# Patient Record
Sex: FEMALE | Race: BLACK OR AFRICAN AMERICAN | NOT HISPANIC OR LATINO | URBAN - METROPOLITAN AREA
[De-identification: names, ages, dates, MRNs, and addresses within clinical notes are randomized per-mention and may not be internally consistent; named-entity substitution may affect disease eponyms.]

---

## 2017-03-08 ENCOUNTER — AMBULATORY - HEALTHEAST (OUTPATIENT)
Dept: VASCULAR SURGERY | Facility: CLINIC | Age: 72
End: 2017-03-08

## 2017-03-08 DIAGNOSIS — M51.369 DEGENERATION OF INTERVERTEBRAL DISC OF LUMBAR REGION: ICD-10-CM

## 2017-03-08 RX ORDER — ATORVASTATIN CALCIUM 80 MG/1
80 TABLET, FILM COATED ORAL
Status: SHIPPED | COMMUNITY
Start: 2016-10-05

## 2017-03-08 RX ORDER — GABAPENTIN 300 MG/1
300 CAPSULE ORAL
Status: SHIPPED | COMMUNITY
Start: 2017-02-28

## 2017-03-08 RX ORDER — DOCUSATE SODIUM 100 MG/1
100 CAPSULE, LIQUID FILLED ORAL
Status: SHIPPED | COMMUNITY
Start: 2015-07-10

## 2017-03-08 RX ORDER — LISINOPRIL 5 MG/1
5 TABLET ORAL
Status: SHIPPED | COMMUNITY
Start: 2016-07-14

## 2017-03-08 RX ORDER — ACETAMINOPHEN 500 MG
1000 TABLET ORAL DAILY PRN
Status: SHIPPED | COMMUNITY
Start: 2014-10-28

## 2017-03-08 RX ORDER — TRAMADOL HYDROCHLORIDE 50 MG/1
50 TABLET ORAL
Status: SHIPPED | COMMUNITY
Start: 2017-03-06

## 2017-03-08 ASSESSMENT — MIFFLIN-ST. JEOR: SCORE: 1343.65

## 2017-03-09 ENCOUNTER — OFFICE VISIT - HEALTHEAST (OUTPATIENT)
Dept: VASCULAR SURGERY | Facility: CLINIC | Age: 72
End: 2017-03-09

## 2017-03-09 DIAGNOSIS — C56.9 OVARIAN CA, UNSPECIFIED LATERALITY (H): ICD-10-CM

## 2017-03-09 DIAGNOSIS — I89.0 ACQUIRED LYMPHEDEMA OF LEG: ICD-10-CM

## 2017-03-09 DIAGNOSIS — E55.9 VITAMIN D DEFICIENCY: ICD-10-CM

## 2017-03-09 DIAGNOSIS — L90.5 SCAR CONDITION AND FIBROSIS OF SKIN: ICD-10-CM

## 2017-03-09 DIAGNOSIS — M79.604 LEG PAIN, BILATERAL: ICD-10-CM

## 2017-03-09 DIAGNOSIS — M79.89 LEG SWELLING: ICD-10-CM

## 2017-03-09 DIAGNOSIS — M79.605 LEG PAIN, BILATERAL: ICD-10-CM

## 2017-03-09 DIAGNOSIS — L03.119 CELLULITIS OF LOWER EXTREMITY, UNSPECIFIED LATERALITY: ICD-10-CM

## 2017-03-09 DIAGNOSIS — R53.83 FATIGUE: ICD-10-CM

## 2017-03-09 ASSESSMENT — MIFFLIN-ST. JEOR: SCORE: 1343.65

## 2017-03-10 ENCOUNTER — HOSPITAL ENCOUNTER (OUTPATIENT)
Dept: CT IMAGING | Facility: CLINIC | Age: 72
Discharge: HOME | End: 2017-03-10
Attending: PHYSICAL MEDICINE & REHABILITATION

## 2017-03-10 DIAGNOSIS — I89.0 ACQUIRED LYMPHEDEMA OF LEG: ICD-10-CM

## 2017-03-10 DIAGNOSIS — L90.5 SCAR CONDITION AND FIBROSIS OF SKIN: ICD-10-CM

## 2017-03-10 DIAGNOSIS — E55.9 VITAMIN D DEFICIENCY: ICD-10-CM

## 2017-03-10 DIAGNOSIS — M79.605 LEG PAIN, BILATERAL: ICD-10-CM

## 2017-03-10 DIAGNOSIS — C56.9 OVARIAN CA, UNSPECIFIED LATERALITY (H): ICD-10-CM

## 2017-03-10 DIAGNOSIS — M79.604 LEG PAIN, BILATERAL: ICD-10-CM

## 2017-03-10 DIAGNOSIS — L03.119 CELLULITIS OF LOWER EXTREMITY, UNSPECIFIED LATERALITY: ICD-10-CM

## 2017-03-10 DIAGNOSIS — M79.89 LEG SWELLING: ICD-10-CM

## 2017-03-10 DIAGNOSIS — R53.83 FATIGUE: ICD-10-CM

## 2017-03-13 ENCOUNTER — COMMUNICATION - HEALTHEAST (OUTPATIENT)
Dept: VASCULAR SURGERY | Facility: CLINIC | Age: 72
End: 2017-03-13

## 2017-03-14 ENCOUNTER — COMMUNICATION - HEALTHEAST (OUTPATIENT)
Dept: VASCULAR SURGERY | Facility: CLINIC | Age: 72
End: 2017-03-14

## 2017-03-14 ENCOUNTER — AMBULATORY - HEALTHEAST (OUTPATIENT)
Dept: VASCULAR SURGERY | Facility: CLINIC | Age: 72
End: 2017-03-14

## 2017-03-14 DIAGNOSIS — C56.9 OVARIAN CA, UNSPECIFIED LATERALITY (H): ICD-10-CM

## 2017-03-16 ENCOUNTER — AMBULATORY - HEALTHEAST (OUTPATIENT)
Dept: LAB | Facility: CLINIC | Age: 72
End: 2017-03-16

## 2017-03-16 ENCOUNTER — COMMUNICATION - HEALTHEAST (OUTPATIENT)
Dept: VASCULAR SURGERY | Facility: CLINIC | Age: 72
End: 2017-03-16

## 2017-03-16 DIAGNOSIS — C56.9 OVARIAN CA, UNSPECIFIED LATERALITY (H): ICD-10-CM

## 2017-03-16 LAB — CANCER AG125 SERPL-ACNC: 29.5 U/ML (ref 0–35)

## 2017-03-19 LAB
MISCELLANEOUS TEST DEPT. - HE HISTORICAL: NORMAL
PERFORMING LAB: NORMAL
SPECIMEN STATUS: NORMAL
TEST NAME: NORMAL

## 2017-03-24 ENCOUNTER — RECORDS - HEALTHEAST (OUTPATIENT)
Dept: ADMINISTRATIVE | Facility: OTHER | Age: 72
End: 2017-03-24

## 2017-03-30 ENCOUNTER — HOSPITAL ENCOUNTER (OUTPATIENT)
Dept: PET IMAGING | Facility: HOSPITAL | Age: 72
Discharge: HOME | End: 2017-03-30
Attending: OBSTETRICS & GYNECOLOGY

## 2017-03-30 ENCOUNTER — AMBULATORY - HEALTHEAST (OUTPATIENT)
Dept: VASCULAR SURGERY | Facility: CLINIC | Age: 72
End: 2017-03-30

## 2017-03-30 DIAGNOSIS — C53.9 CERVICAL CANCER (H): ICD-10-CM

## 2017-04-27 ENCOUNTER — AMBULATORY - HEALTHEAST (OUTPATIENT)
Dept: VASCULAR SURGERY | Facility: CLINIC | Age: 72
End: 2017-04-27

## 2017-04-27 ENCOUNTER — OFFICE VISIT - HEALTHEAST (OUTPATIENT)
Dept: VASCULAR SURGERY | Facility: CLINIC | Age: 72
End: 2017-04-27

## 2017-04-27 DIAGNOSIS — M79.89 LEG SWELLING: ICD-10-CM

## 2017-04-27 DIAGNOSIS — C53.9: ICD-10-CM

## 2017-04-27 DIAGNOSIS — L90.5 SCAR CONDITION AND FIBROSIS OF SKIN: ICD-10-CM

## 2017-04-27 DIAGNOSIS — M79.604 LEG PAIN, BILATERAL: ICD-10-CM

## 2017-04-27 DIAGNOSIS — I89.0 LYMPHEDEMA: ICD-10-CM

## 2017-04-27 DIAGNOSIS — M79.605 LEG PAIN, BILATERAL: ICD-10-CM

## 2017-04-27 RX ORDER — OMEGA-3S/DHA/EPA/FISH OIL/D3 300MG-1000
800 CAPSULE ORAL
Status: SHIPPED | COMMUNITY
Start: 2017-03-23

## 2017-04-27 RX ORDER — FUROSEMIDE 20 MG
TABLET ORAL
Status: SHIPPED | COMMUNITY
Start: 2017-03-09

## 2017-05-22 ENCOUNTER — COMMUNICATION - HEALTHEAST (OUTPATIENT)
Dept: VASCULAR SURGERY | Facility: CLINIC | Age: 72
End: 2017-05-22

## 2017-05-23 ENCOUNTER — COMMUNICATION - HEALTHEAST (OUTPATIENT)
Dept: VASCULAR SURGERY | Facility: CLINIC | Age: 72
End: 2017-05-23

## 2017-06-19 ENCOUNTER — RECORDS - HEALTHEAST (OUTPATIENT)
Dept: ADMINISTRATIVE | Facility: OTHER | Age: 72
End: 2017-06-19

## 2017-06-22 ENCOUNTER — COMMUNICATION - HEALTHEAST (OUTPATIENT)
Dept: VASCULAR SURGERY | Facility: CLINIC | Age: 72
End: 2017-06-22

## 2017-07-11 ENCOUNTER — AMBULATORY - HEALTHEAST (OUTPATIENT)
Dept: VASCULAR SURGERY | Facility: CLINIC | Age: 72
End: 2017-07-11

## 2017-08-21 ENCOUNTER — OFFICE VISIT - HEALTHEAST (OUTPATIENT)
Dept: VASCULAR SURGERY | Facility: CLINIC | Age: 72
End: 2017-08-21

## 2017-08-21 DIAGNOSIS — M79.605 LEG PAIN, BILATERAL: ICD-10-CM

## 2017-08-21 DIAGNOSIS — I89.0 ACQUIRED LYMPHEDEMA OF LEG: ICD-10-CM

## 2017-08-21 DIAGNOSIS — M79.604 LEG PAIN, BILATERAL: ICD-10-CM

## 2017-08-21 DIAGNOSIS — E55.9 VITAMIN D DEFICIENCY: ICD-10-CM

## 2017-08-21 DIAGNOSIS — M21.961: ICD-10-CM

## 2017-08-21 DIAGNOSIS — M21.962: ICD-10-CM

## 2017-08-21 DIAGNOSIS — I89.0 ELEPHANTIASIS: ICD-10-CM

## 2017-08-21 DIAGNOSIS — E66.01 MORBID (SEVERE) OBESITY DUE TO EXCESS CALORIES (H): ICD-10-CM

## 2017-08-21 DIAGNOSIS — M21.952: ICD-10-CM

## 2017-08-21 DIAGNOSIS — M21.951: ICD-10-CM

## 2017-08-24 ENCOUNTER — RECORDS - HEALTHEAST (OUTPATIENT)
Dept: ADMINISTRATIVE | Facility: OTHER | Age: 72
End: 2017-08-24

## 2017-08-24 ENCOUNTER — COMMUNICATION - HEALTHEAST (OUTPATIENT)
Dept: VASCULAR SURGERY | Facility: CLINIC | Age: 72
End: 2017-08-24

## 2017-08-28 ENCOUNTER — RECORDS - HEALTHEAST (OUTPATIENT)
Dept: ADMINISTRATIVE | Facility: OTHER | Age: 72
End: 2017-08-28

## 2018-03-07 ENCOUNTER — COMMUNICATION - HEALTHEAST (OUTPATIENT)
Dept: VASCULAR SURGERY | Facility: CLINIC | Age: 73
End: 2018-03-07

## 2018-03-20 ENCOUNTER — COMMUNICATION - HEALTHEAST (OUTPATIENT)
Dept: VASCULAR SURGERY | Facility: CLINIC | Age: 73
End: 2018-03-20

## 2018-03-21 ENCOUNTER — OFFICE VISIT - HEALTHEAST (OUTPATIENT)
Dept: VASCULAR SURGERY | Facility: CLINIC | Age: 73
End: 2018-03-21

## 2018-03-21 ENCOUNTER — HOME CARE/HOSPICE - HEALTHEAST (OUTPATIENT)
Dept: HOME HEALTH SERVICES | Facility: HOME HEALTH | Age: 73
End: 2018-03-21

## 2018-03-21 DIAGNOSIS — C53.9: ICD-10-CM

## 2018-03-21 DIAGNOSIS — I89.0 ELEPHANTIASIS: ICD-10-CM

## 2018-03-21 DIAGNOSIS — M21.959 ACQUIRED LEG DEFORMITY: ICD-10-CM

## 2018-03-21 DIAGNOSIS — M79.604 LEG PAIN, BILATERAL: ICD-10-CM

## 2018-03-21 DIAGNOSIS — M79.89 LEG SWELLING: ICD-10-CM

## 2018-03-21 DIAGNOSIS — M79.605 LEG PAIN, BILATERAL: ICD-10-CM

## 2018-03-21 DIAGNOSIS — I89.0 ACQUIRED LYMPHEDEMA OF LEG: ICD-10-CM

## 2018-03-21 DIAGNOSIS — I89.0 LYMPHEDEMA: ICD-10-CM

## 2018-03-21 DIAGNOSIS — E66.01 MORBID OBESITY WITH BMI OF 40.0-44.9, ADULT (H): ICD-10-CM

## 2018-03-23 ENCOUNTER — COMMUNICATION - HEALTHEAST (OUTPATIENT)
Dept: HOME HEALTH SERVICES | Facility: HOME HEALTH | Age: 73
End: 2018-03-23

## 2018-03-24 ENCOUNTER — HOME CARE/HOSPICE - HEALTHEAST (OUTPATIENT)
Dept: HOME HEALTH SERVICES | Facility: HOME HEALTH | Age: 73
End: 2018-03-24

## 2018-03-26 ENCOUNTER — COMMUNICATION - HEALTHEAST (OUTPATIENT)
Dept: HOME HEALTH SERVICES | Facility: HOME HEALTH | Age: 73
End: 2018-03-26

## 2018-03-28 ENCOUNTER — HOME CARE/HOSPICE - HEALTHEAST (OUTPATIENT)
Dept: HOME HEALTH SERVICES | Facility: HOME HEALTH | Age: 73
End: 2018-03-28

## 2018-04-02 ENCOUNTER — HOME CARE/HOSPICE - HEALTHEAST (OUTPATIENT)
Dept: HOME HEALTH SERVICES | Facility: HOME HEALTH | Age: 73
End: 2018-04-02

## 2018-04-03 ENCOUNTER — HOME CARE/HOSPICE - HEALTHEAST (OUTPATIENT)
Dept: HOME HEALTH SERVICES | Facility: HOME HEALTH | Age: 73
End: 2018-04-03

## 2018-04-04 ENCOUNTER — HOME CARE/HOSPICE - HEALTHEAST (OUTPATIENT)
Dept: HOME HEALTH SERVICES | Facility: HOME HEALTH | Age: 73
End: 2018-04-04

## 2018-04-05 ENCOUNTER — HOME CARE/HOSPICE - HEALTHEAST (OUTPATIENT)
Dept: HOME HEALTH SERVICES | Facility: HOME HEALTH | Age: 73
End: 2018-04-05

## 2018-04-06 ENCOUNTER — HOME CARE/HOSPICE - HEALTHEAST (OUTPATIENT)
Dept: HOME HEALTH SERVICES | Facility: HOME HEALTH | Age: 73
End: 2018-04-06

## 2018-04-09 ENCOUNTER — HOME CARE/HOSPICE - HEALTHEAST (OUTPATIENT)
Dept: HOME HEALTH SERVICES | Facility: HOME HEALTH | Age: 73
End: 2018-04-09

## 2018-04-10 ENCOUNTER — HOME CARE/HOSPICE - HEALTHEAST (OUTPATIENT)
Dept: HOME HEALTH SERVICES | Facility: HOME HEALTH | Age: 73
End: 2018-04-10

## 2018-04-11 ENCOUNTER — HOME CARE/HOSPICE - HEALTHEAST (OUTPATIENT)
Dept: HOME HEALTH SERVICES | Facility: HOME HEALTH | Age: 73
End: 2018-04-11

## 2018-04-12 ENCOUNTER — HOME CARE/HOSPICE - HEALTHEAST (OUTPATIENT)
Dept: HOME HEALTH SERVICES | Facility: HOME HEALTH | Age: 73
End: 2018-04-12

## 2018-04-13 ENCOUNTER — HOME CARE/HOSPICE - HEALTHEAST (OUTPATIENT)
Dept: HOME HEALTH SERVICES | Facility: HOME HEALTH | Age: 73
End: 2018-04-13

## 2018-04-13 ENCOUNTER — COMMUNICATION - HEALTHEAST (OUTPATIENT)
Dept: HOME HEALTH SERVICES | Facility: HOME HEALTH | Age: 73
End: 2018-04-13

## 2018-04-16 ENCOUNTER — HOME CARE/HOSPICE - HEALTHEAST (OUTPATIENT)
Dept: HOME HEALTH SERVICES | Facility: HOME HEALTH | Age: 73
End: 2018-04-16

## 2018-04-17 ENCOUNTER — HOME CARE/HOSPICE - HEALTHEAST (OUTPATIENT)
Dept: HOME HEALTH SERVICES | Facility: HOME HEALTH | Age: 73
End: 2018-04-17

## 2018-04-18 ENCOUNTER — HOME CARE/HOSPICE - HEALTHEAST (OUTPATIENT)
Dept: HOME HEALTH SERVICES | Facility: HOME HEALTH | Age: 73
End: 2018-04-18

## 2018-04-19 ENCOUNTER — HOME CARE/HOSPICE - HEALTHEAST (OUTPATIENT)
Dept: HOME HEALTH SERVICES | Facility: HOME HEALTH | Age: 73
End: 2018-04-19

## 2018-04-20 ENCOUNTER — HOME CARE/HOSPICE - HEALTHEAST (OUTPATIENT)
Dept: HOME HEALTH SERVICES | Facility: HOME HEALTH | Age: 73
End: 2018-04-20

## 2018-04-23 ENCOUNTER — HOME CARE/HOSPICE - HEALTHEAST (OUTPATIENT)
Dept: HOME HEALTH SERVICES | Facility: HOME HEALTH | Age: 73
End: 2018-04-23

## 2018-04-24 ENCOUNTER — HOME CARE/HOSPICE - HEALTHEAST (OUTPATIENT)
Dept: HOME HEALTH SERVICES | Facility: HOME HEALTH | Age: 73
End: 2018-04-24

## 2018-04-25 ENCOUNTER — HOME CARE/HOSPICE - HEALTHEAST (OUTPATIENT)
Dept: HOME HEALTH SERVICES | Facility: HOME HEALTH | Age: 73
End: 2018-04-25

## 2018-04-26 ENCOUNTER — HOME CARE/HOSPICE - HEALTHEAST (OUTPATIENT)
Dept: HOME HEALTH SERVICES | Facility: HOME HEALTH | Age: 73
End: 2018-04-26

## 2018-04-27 ENCOUNTER — HOME CARE/HOSPICE - HEALTHEAST (OUTPATIENT)
Dept: HOME HEALTH SERVICES | Facility: HOME HEALTH | Age: 73
End: 2018-04-27

## 2019-05-20 ENCOUNTER — OFFICE VISIT - HEALTHEAST (OUTPATIENT)
Dept: OTHER | Facility: HOME HEALTH | Age: 74
End: 2019-05-20

## 2021-05-30 VITALS — WEIGHT: 216 LBS | HEIGHT: 57 IN | BODY MASS INDEX: 46.6 KG/M2

## 2021-05-30 VITALS — HEIGHT: 57 IN | WEIGHT: 216 LBS | BODY MASS INDEX: 46.6 KG/M2

## 2021-06-02 ENCOUNTER — RECORDS - HEALTHEAST (OUTPATIENT)
Dept: ADMINISTRATIVE | Facility: CLINIC | Age: 76
End: 2021-06-02

## 2021-06-10 NOTE — PROGRESS NOTES
Date of Service: 04/27/2017     Date last seen by Dr. Hercules:  3/16/2017    PCP: Drea Lopes    Impression:  1.  Acquired lymphedema severe-elephantiasis bilateral legs-complicated by underlying lipedema  2.  Dependent swelling with venous hypertension of the legs bilaterally  3.  Leg pain  4.  Vit C and D deficiency  5.  Cervical Ca (2001 ROSCOE, chemo and radiation)     Plan:  1. Questions were answered.  Daughter present.  2.  Appreciate work up by Dr. Ambrose.  3. Continue Vit C supplementation for full month.  On Vit D also.  4.  Therapy for better control of swelling and then compression. Re-ordered.    5   Patient will follow up in 3 months or when needed.      Time spent with patient 15 minutes with greater than 50% time in consultation, education and coordination of care.     ---------------------------------------------------------------------------------------------------------------------    Chief Complaint: Bilateral leg swelling     History of Present Illness:     Kaylyn Godfrey returns to the [facility] with bilateral leg swelling due to secondary lymphedema after treatment for cervical carcinoma complicated by underlying lipedema.  When I saw her there was concern for recurrent disease.  CT was done and she saw Dr. Ambrose .  Work up with PET scan was negative and it was felt CT abnormalities were due to sacroiliitis and side effects from radiation.  Keflex was started for cellulitis of the thighs.  She reports that helped greatly and the pain is less.  Labs were checked for Vit deficiencies and thyroid issues.  Vit D was slightly low and Vit C was very low.  She is on supplementation.  She is here with her daughter with is her .    There has been no new numbness, tingling or weakness.  There have been no new masses, rashes, or swellings of any other joints. There has been no new decrease in appetite, unexplained weight loss, abdominal bloating, bowel or bladder changes  and irregular bleeding.      Past Medical History:   Diagnosis Date     Anemia      Degeneration of intervertebral disc of lumbar region 10/28/2014    Overview:  MRI with multilevel DDD, with L5 nerve impingement.     Dyslipidemia 6/16/2010     HH (hiatus hernia)      Hypertension 6/16/2010     Lymphedema 6/16/2010    Overview:  Bilateral lower extremity, since ROSCOE with lymph node dissection for uterine CA, chemotherapy and radiation 2001. Cellulitis 2005, 2010, 2013     Morbid (severe) obesity due to excess calories 11/27/2012     Neurosis, posttraumatic 8/16/2011     Other specified health status 8/21/2014    Overview:  GREBO      Ovarian carcinoma 2001    ROSCOE, chemo and radiation     Severe episode of recurrent major depressive disorder, without psychotic features 8/16/2011     Spinal stenosis of lumbar region 4/20/2015     Systolic murmur      Type 2 diabetes mellitus 6/16/2010    Overview:  Better controlled, last A1C 2/2015: 7.0       Past Surgical History:   Procedure Laterality Date     HYSTERECTOMY           Current Outpatient Prescriptions:      artificial tears,hypromellose, (GENTEAL) 0.3 % Drop, Apply to eye., Disp: , Rfl:      ascorbic acid, vitamin C, (VITAMIN C) 250 MG tablet, One tablet daily per [facility] 3/20/1, Disp: , Rfl:      aspirin 81 MG EC tablet, Take 81 mg by mouth., Disp: , Rfl:      atorvastatin (LIPITOR) 80 MG tablet, Take 80 mg by mouth., Disp: , Rfl:      cholecalciferol, vitamin D3, 400 unit Tab, Take 800 Units by mouth., Disp: , Rfl:      docusate sodium (COLACE) 100 MG capsule, Take 100 mg by mouth., Disp: , Rfl:      furosemide (LASIX) 20 MG tablet, Take 1 tablet (20 mg total) by mouth 2 (two) times a day at 9am and 6pm for 4 days. Instructed to eat a banana with it daily for potassium, Disp: , Rfl:      gabapentin (NEURONTIN) 300 MG capsule, Take 300 mg by mouth., Disp: , Rfl:      insulin glargine (LANTUS; BASAGLAR) 100 unit/mL (3 mL) pen, Inject 40 Units under  the skin., Disp: , Rfl:      lisinopril (PRINIVIL,ZESTRIL) 5 MG tablet, Take 5 mg by mouth., Disp: , Rfl:      metFORMIN (GLUCOPHAGE) 1000 MG tablet, Take 1,000 mg by mouth., Disp: , Rfl:      traMADol (ULTRAM) 50 mg tablet, Take 50 mg by mouth., Disp: , Rfl:      acetaminophen (TYLENOL) 500 MG tablet, Take 1,000 mg by mouth daily as needed. , Disp: , Rfl:     No Known Allergies    Social History     Social History     Marital status: Single     Spouse name: N/A     Number of children: N/A     Years of education: N/A     Occupational History     Not on file.     Social History Main Topics     Smoking status: Never Smoker     Smokeless tobacco: Never Used     Alcohol use No     Drug use: No     Sexual activity: No     Other Topics Concern     Not on file     Social History Narrative    .  Six children.  Apartment with [employer] and family helping.       Family History   Problem Relation Age of Onset     No Medical Problems Mother      No Medical Problems Father      No Medical Problems Brother      No Medical Problems Daughter      No Medical Problems Daughter      No Medical Problems Daughter      No Medical Problems Daughter      No Medical Problems Son      No Medical Problems Son      No Medical Problems Son        Review of Systems:  Kaylyn MAEVE Godfrey no new numbess, tingling or weakness, redness or rashes, fevers, new masses, abdominal bloating or discomfort, unexplained weight loss, increased pain, new ulcers, shortness of breath and chest pain  Full 12 point review of systems was completed.    Imaging:  CT ABDOMEN PELVIS W ORAL W IV CONTRAST  3/10/2017 2:36 PM      INDICATION: History of ovarian carcinoma. Leg swelling and abdominal fullness.  TECHNIQUE: CT abdomen and pelvis. Multiplanar reformation images (MPR). Dose reduction techniques were used.   IV CONTRAST: Iohexol (Omni) 100 mL  COMPARISON: None available     FINDINGS:  LUNG BASES: Negative.     ABDOMEN: There are small calcifications along the  periphery of the spleen and liver. Otherwise the spleen, pancreas, and adrenal glands are normal in appearance. The liver is normal in appearance with hypoattenuation adjacent to the falciform ligament   likely a perfusion abnormality or focal fat deposition. The gallbladder is normal in appearance. There is no biliary ductal dilatation. Incidentally noted is pancreas divisum or a persistent patent duct of Santorini. A subcentimeter hypoattenuating focus   in the left kidney is too small to characterize. Otherwise the kidneys are normal in appearance without hydronephrosis or hydroureter. The stomach is distended with fluid an air. Small bowel is normal in caliber. No enlarged lymph nodes in the   gastrohepatic a eddie hepatis, mesenteric, or retroperitoneal distributions. There are surgical clips in the retroperitoneum.     PELVIS: The urinary bladder exhibits circumferential wall thickening. The patient is status post hysterectomy. The ovaries are not identified and may be surgically absent. The colon is normal in appearance. The appendix is likely normal in caliber. There   are surgical clips along the pelvic sidewalls. No pelvic lymphadenopathy identified.     MUSCULOSKELETAL: There is sclerosis of the iliac bones adjacent to the sacroiliac joints. Bony lucencies noted at both sacral iliac joints and within the areas of sclerosis. There is sclerosis and irregularity of the pubic symphysis. There is mild   stranding in the included anterior lower extremities. Mild curvature of the spine.     IMPRESSION:   CONCLUSION:  1.  Circumferential wall thickening of the urinary bladder. Correlate for cystitis.  2.  Mild stranding in the subcutaneous fat of the included lower extremities, nonspecific though possibly related to edema.  3.  Status post hysterectomy. Likely status post bilateral salpingo-oophorectomy. There are surgical clips along both pelvic sidewalls and in the retroperitoneum. There are no new enlarged  lymph nodes.  4.  Sclerosis and lucency of the pelvis adjacent to the sacroiliac joints may be related to metastatic disease and/or prior treatment. Recommend comparison with any prior studies. Consider additional evaluation if clinically indicated.    Labs:  No results found for: SEDRATE      No results found for: CRP        Lab Results   Component Value Date    CREATININE 0.71 06/11/2010      No results found for: HGBA1C        Lab Results   Component Value Date    BUN 14 06/11/2010              No results found for: LABPROT, ALBUMIN    Vitamin D, Total (25-Hydroxy)   Date Value Ref Range Status   03/10/2017 24.3 (L) 30.0 - 80.0 ng/mL Final       Ref Range & Units3/10/17  1:00 PM  TSH0.30 - 5.00 uIU/mL  1.75  [facility]  Ref Range & Units3/10/17  1:00 PM  Free Retinol (Vit A)32.5 - 78.0 mcg/dL  40.0  Ref Range & Units3/16/17 12:06 PM  CA 1250.0 - 35.0 U/mL  29.5    3/16/17  Vit C  <0.1 mg/dl    Physical Exam:  Vitals:    04/27/17 0935   BP: 110/62   Resp: 18   Temp: 98.2  F (36.8  C)    There is no height or weight on file to calculate BMI.    Circumferential measures:    Vasc Edema 3/9/2017 4/27/2017   Right just above MTP 24 26.4   Right Ankle 27.3 30   Right Widest Calf 64.5 67   Right Thigh Up 10cm 84 84   Left - just above MTP 23.2 23.4   Left Ankle 24.8 29.2   Left Widest Calf 65.5 69.2   Left Thigh Up 10cm 82.5 82.5       General:  72 y.o. female in no apparent distress.  Alert and oriented x 3.  Cooperative. Affect normal.    Musculoskeletal:  Normal range of motion in hips, knees and ankles bilaterally throughout .  There is no active joint synovitis, erythema, swelling or joint laxity.     Integumentary: Skin of the legs is uniformly warm and dry, erythematous with calor , with hyperkeratosis and keratoderma and with positive Stemmer's Sign . Less pain to palpation on medial upper thighs bilaterally. There is severe fibrosis of the legs, feet and toes.  +1 pitting edema with nonpitting edema.   Nails are thickened, decreased hair growth on toes, discolored, ingrown and brittleness. The light weeping areas in both legs have stopped draining.    Marilu Hercules MD, ABWMS, FACCWS, [facility]  Medical Director Wound Care and Lymphedema  [facility]  [phone]

## 2021-06-12 NOTE — PROGRESS NOTES
Date of Service:  17    Date last seen by Dr. Hercules:  2017    PCP: Drea Lopes    Impression:  1.  Acquired lymphedema severe-elephantiasis bilateral legs-complicated by underlying lipedema  2.  Dependent swelling with venous hypertension of the legs bilaterally  3.  Leg pain  4.  Vit C and D deficiency.  Vit C should be okay now.   5.  Cervical Ca ( ROSCOE, chemo and radiation)     Plan:  1. Questions were answered.  Daughter present.  2. Continue Vit C supplementation for full month.  On Vit D also.  Should stop Vit C at this time.  Will recheck level.    3. Continue exercise and compression.   Reviewed options for getting compression covered.  Encouraged bandaging as insurance does not cover garments event though she has severe leg deformity due to the lymphedema and requires compression to decrease risk of wosening swelling, functional loss, pain, illness, infections and carcinomas.  Bandaging however, is not nearly as practical to keep her moving and functional.    4.   Patient will follow up in 6 months or when needed.    Time spent with patient 15 minutes with greater than 50% time in consultation, education and coordination of care.     ---------------------------------------------------------------------------------------------------------------------    Chief Complaint: Bilateral leg swelling     History of Present Illness:     Kaylyn Godfrey returns to the [facility] with bilateral leg swelling due to secondary lymphedema after treatment for cervical carcinoma complicated by underlying lipedema.  When I saw her there was concern for recurrent disease.  CT was done and she saw Dr. Ambrose .  Work up with PET scan was negative and it was felt CT abnormalities were due to sacroiliitis and side effects from radiation.  Keflex was started for cellulitis of the thighs which helped.  Labs were checked for Vit deficiencies and thyroid issues.  Vit D was slightly low and Vit C  was very low.  She continues on supplementation.  She is here with her daughter with is her .    She was sent to therapy of better control of the swelling and then to get good compression.  The swelling decreased greatly and she is pleased.  Unfortunately, compression was not covered by her insurance and she is wearing her older compression.  She cut out the toe piece as she felt they wear uncomfortable.  There has been no new numbness, tingling or weakness.  There have been no new masses, rashes, or swellings of any other joints. There has been no new decrease in appetite, unexplained weight loss, abdominal bloating, bowel or bladder changes and irregular bleeding.      Past Medical History:   Diagnosis Date     Anemia      Degeneration of intervertebral disc of lumbar region 10/28/2014    Overview:  MRI with multilevel DDD, with L5 nerve impingement.     Dyslipidemia 6/16/2010     HH (hiatus hernia)      Hypertension 6/16/2010     Lymphedema 6/16/2010    Overview:  Bilateral lower extremity, since ROSCOE with lymph node dissection for uterine CA, chemotherapy and radiation 2001. Cellulitis 2005, 2010, 2013     Morbid (severe) obesity due to excess calories 11/27/2012     Neurosis, posttraumatic 8/16/2011     Other specified health status 8/21/2014    Overview:  GREBO      Ovarian carcinoma 2001    ROSCOE, chemo and radiation     Severe episode of recurrent major depressive disorder, without psychotic features 8/16/2011     Spinal stenosis of lumbar region 4/20/2015     Systolic murmur      Type 2 diabetes mellitus 6/16/2010    Overview:  Better controlled, last A1C 2/2015: 7.0       Past Surgical History:   Procedure Laterality Date     HYSTERECTOMY           Current Outpatient Prescriptions:      acetaminophen (TYLENOL) 500 MG tablet, Take 1,000 mg by mouth daily as needed. , Disp: , Rfl:      artificial tears,hypromellose, (GENTEAL) 0.3 % Drop, Apply to eye., Disp: , Rfl:      ascorbic acid, vitamin C,  (VITAMIN C) 250 MG tablet, One tablet daily per [facility] 3/20/1, Disp: , Rfl:      aspirin 81 MG EC tablet, Take 81 mg by mouth., Disp: , Rfl:      atorvastatin (LIPITOR) 80 MG tablet, Take 80 mg by mouth., Disp: , Rfl:      cholecalciferol, vitamin D3, 400 unit Tab, Take 800 Units by mouth., Disp: , Rfl:      docusate sodium (COLACE) 100 MG capsule, Take 100 mg by mouth., Disp: , Rfl:      furosemide (LASIX) 20 MG tablet, Take 1 tablet (20 mg total) by mouth 2 (two) times a day at 9am and 6pm for 4 days. Instructed to eat a banana with it daily for potassium, Disp: , Rfl:      gabapentin (NEURONTIN) 300 MG capsule, Take 300 mg by mouth., Disp: , Rfl:      insulin glargine (LANTUS; BASAGLAR) 100 unit/mL (3 mL) pen, Inject 40 Units under the skin., Disp: , Rfl:      lisinopril (PRINIVIL,ZESTRIL) 5 MG tablet, Take 5 mg by mouth., Disp: , Rfl:      metFORMIN (GLUCOPHAGE) 1000 MG tablet, Take 1,000 mg by mouth., Disp: , Rfl:      traMADol (ULTRAM) 50 mg tablet, Take 50 mg by mouth., Disp: , Rfl:     No Known Allergies    Social History     Social History     Marital status: Single     Spouse name: N/A     Number of children: N/A     Years of education: N/A     Occupational History     Not on file.     Social History Main Topics     Smoking status: Never Smoker     Smokeless tobacco: Never Used     Alcohol use No     Drug use: No     Sexual activity: No     Other Topics Concern     Not on file     Social History Narrative    .  Six children.  Apartment with [employer] and family helping.       Family History   Problem Relation Age of Onset     No Medical Problems Mother      No Medical Problems Father      No Medical Problems Brother      No Medical Problems Daughter      No Medical Problems Daughter      No Medical Problems Daughter      No Medical Problems Daughter      No Medical Problems Son      No Medical Problems Son      No Medical Problems Son        Review of Systems:  Kaylyn Godfrey no emerita  numbess, tingling or weakness, redness or rashes, fevers, new masses, abdominal bloating or discomfort, unexplained weight loss, increased pain, new ulcers, shortness of breath and chest pain  Full 12 point review of systems was completed.    Imaging:  CT ABDOMEN PELVIS W ORAL W IV CONTRAST  3/10/2017 2:36 PM      INDICATION: History of ovarian carcinoma. Leg swelling and abdominal fullness.  TECHNIQUE: CT abdomen and pelvis. Multiplanar reformation images (MPR). Dose reduction techniques were used.   IV CONTRAST: Iohexol (Omni) 100 mL  COMPARISON: None available     FINDINGS:  LUNG BASES: Negative.     ABDOMEN: There are small calcifications along the periphery of the spleen and liver. Otherwise the spleen, pancreas, and adrenal glands are normal in appearance. The liver is normal in appearance with hypoattenuation adjacent to the falciform ligament   likely a perfusion abnormality or focal fat deposition. The gallbladder is normal in appearance. There is no biliary ductal dilatation. Incidentally noted is pancreas divisum or a persistent patent duct of Santorini. A subcentimeter hypoattenuating focus   in the left kidney is too small to characterize. Otherwise the kidneys are normal in appearance without hydronephrosis or hydroureter. The stomach is distended with fluid an air. Small bowel is normal in caliber. No enlarged lymph nodes in the   gastrohepatic a eddie hepatis, mesenteric, or retroperitoneal distributions. There are surgical clips in the retroperitoneum.     PELVIS: The urinary bladder exhibits circumferential wall thickening. The patient is status post hysterectomy. The ovaries are not identified and may be surgically absent. The colon is normal in appearance. The appendix is likely normal in caliber. There   are surgical clips along the pelvic sidewalls. No pelvic lymphadenopathy identified.     MUSCULOSKELETAL: There is sclerosis of the iliac bones adjacent to the sacroiliac joints. Bony lucencies  noted at both sacral iliac joints and within the areas of sclerosis. There is sclerosis and irregularity of the pubic symphysis. There is mild   stranding in the included anterior lower extremities. Mild curvature of the spine.     IMPRESSION:   CONCLUSION:  1.  Circumferential wall thickening of the urinary bladder. Correlate for cystitis.  2.  Mild stranding in the subcutaneous fat of the included lower extremities, nonspecific though possibly related to edema.  3.  Status post hysterectomy. Likely status post bilateral salpingo-oophorectomy. There are surgical clips along both pelvic sidewalls and in the retroperitoneum. There are no new enlarged lymph nodes.  4.  Sclerosis and lucency of the pelvis adjacent to the sacroiliac joints may be related to metastatic disease and/or prior treatment. Recommend comparison with any prior studies. Consider additional evaluation if clinically indicated.    Labs:        Lab Results   Component Value Date    CREATININE 0.71 06/11/2010      No results found for: HGBA1C        Lab Results   Component Value Date    BUN 14 06/11/2010              No results found for: LABPROT, ALBUMIN    Vitamin D, Total (25-Hydroxy)   Date Value Ref Range Status   03/10/2017 24.3 (L) 30.0 - 80.0 ng/mL Final       Ref Range & Units3/10/17  1:00 PM  TSH0.30 - 5.00 uIU/mL  1.75  [facility]  Ref Range & Units3/10/17  1:00 PM  Free Retinol (Vit A)32.5 - 78.0 mcg/dL  40.0  Ref Range & Units3/16/17 12:06 PM  CA 1250.0 - 35.0 U/mL  29.5    3/16/17  Vit C  <0.1 mg/dl    Physical Exam:  Vitals:    08/21/17 0950   BP: 96/62   Pulse: 72   Resp: 16   Temp: 98.3  F (36.8  C)     BMI 46.74    Circumferential measures:    Vasc Edema 3/9/2017 4/27/2017 8/21/2017   Right just above MTP 24 26.4 24.5   Right Ankle 27.3 30 22.5   Right Widest Calf 64.5 67 63   Right Thigh Up 10cm 84 84 69.6   Left - just above MTP 23.2 23.4 23.5   Left Ankle 24.8 29.2 23.2   Left Widest Calf 65.5 69.2 64   Left Thigh Up 10cm  82.5 82.5 76     Circumferential measures are greatly improved.     General:  72 y.o. female in no apparent distress.  Alert and oriented x 3.  Cooperative. Affect normal.    Musculoskeletal:  Normal range of motion in hips, knees and ankles bilaterally throughout .  There is no active joint synovitis, erythema, swelling or joint laxity.     Integumentary: Skin of the legs is uniformly warm and dry, erythematous with calor , with hyperkeratosis and keratoderma and with positive Stemmer's Sign. Less pain to palpation on medial upper thighs bilaterally. There continues to be severe fibrosis of the legs, feet and toes.  The swelling has greatly decreased.   +1 pitting edema with nonpitting edema.  Nails are thickened, decreased hair growth on toes, discolored, ingrown and brittleness. The light weeping areas in both legs have stopped draining.    Marilu Hercules MD, ABWMS, FACCWS, [facility]  Medical Director Wound Care and Lymphedema  [facility]  [phone]

## 2021-06-15 PROBLEM — E55.9 VITAMIN D DEFICIENCY: Status: ACTIVE | Noted: 2017-03-09

## 2021-06-15 PROBLEM — L90.5 SCAR CONDITION AND FIBROSIS OF SKIN: Status: ACTIVE | Noted: 2017-03-09

## 2021-06-15 PROBLEM — M54.50 LOW BACK PAIN: Status: ACTIVE | Noted: 2017-03-08

## 2021-06-15 PROBLEM — M79.89 LEG SWELLING: Status: ACTIVE | Noted: 2017-03-09

## 2021-06-15 PROBLEM — M79.605 LEG PAIN, BILATERAL: Status: ACTIVE | Noted: 2017-03-09

## 2021-06-15 PROBLEM — M79.604 LEG PAIN, BILATERAL: Status: ACTIVE | Noted: 2017-03-09

## 2021-06-15 PROBLEM — C53.9 CERVICAL CA (H): Status: ACTIVE | Noted: 2017-04-27

## 2021-06-15 PROBLEM — R53.83 FATIGUE: Status: ACTIVE | Noted: 2017-03-09

## 2021-06-15 PROBLEM — I89.0 ACQUIRED LYMPHEDEMA OF LEG: Status: ACTIVE | Noted: 2017-03-09

## 2021-06-16 PROBLEM — M21.959: Status: ACTIVE | Noted: 2017-08-21

## 2021-06-16 PROBLEM — Z85.41 HX OF CERVICAL CANCER: Status: ACTIVE | Noted: 2017-04-10

## 2021-06-16 PROBLEM — I89.0 ELEPHANTIASIS: Status: ACTIVE | Noted: 2017-08-21

## 2021-06-16 NOTE — PROGRESS NOTES
Date of Service:  18    Date last seen by Dr. Hercules:  2017    PCP: Drea Lopes    Impression:  1.  Acquired lymphedema severe-elephantiasis bilateral legs-complicated by underlying lipedema-worsening  2.  Dependent swelling with venous hypertension of the legs bilaterally  3.  Leg pain  4.  Leg deformities due to above  5.  Cervical Ca ( ROSCOE, chemo and radiation)        Plan:  1. Questions were answered.  Daughter present.  2. Continue exercise and compression.   Reviewed options for getting compression covered.  Still bandaging as insurance does not cover garments event though she has severe leg deformity due to the lymphedema and requires compression to decrease risk of wosening swelling, functional loss, pain, illness, infections and carcinomas.  Bandaging however, is not nearly as practical to keep her moving and functional.  I was able to find Farrow 4000 wraps large which may work and can be ordered less expensively OTC>  Information was given.  I will get her to therapy to decrease the swelling again and then work on using the Farrow 4000 wrap.  Orders written.  She will get [facility].  She can only ambulate short distances due to back pain and leg heaviness.  She uses a walker mainly.  3 Patient will follow up in 3 months or when needed.    Time spent with patient 15 minutes with greater than 50% time in consultation, education and coordination of care.     ---------------------------------------------------------------------------------------------------------------------    Chief Complaint: Bilateral leg swelling     History of Present Illness:     Kaylyn Godfrey returns to the [facility] with bilateral leg swelling due to secondary lymphedema after treatment for cervical carcinoma complicated by underlying lipedema.  When I saw her there was concern for recurrent disease.  CT was done and she saw Dr. Ambrose .  Work up with PET scan was negative and it was felt CT  abnormalities were due to sacroiliitis and side effects from radiation.  Keflex was started for cellulitis of the thighs which helped.  Labs were checked for Vit deficiencies and thyroid issues.  Vit D was slightly low and Vit C was very low.  She was supplemented.  She is here with her daughter with is her .    She initially was sent to therapy of better control of the swelling and then to get good compression.  The swelling decreased greatly.  Unfortunately, compression was not covered by her insurance and she and her family have been continuing to bandage which is very time consuming and cumbersome, limiting activity.   There has been no new numbness, tingling or weakness.  There have been no new masses, rashes, or swellings of any other joints. There has been no new decrease in appetite, unexplained weight loss, abdominal bloating, bowel or bladder changes and irregular bleeding.      Past Medical History:   Diagnosis Date     Anemia      Degeneration of intervertebral disc of lumbar region 10/28/2014    Overview:  MRI with multilevel DDD, with L5 nerve impingement.     Dyslipidemia 6/16/2010     HH (hiatus hernia)      Hypertension 6/16/2010     Lymphedema 6/16/2010    Overview:  Bilateral lower extremity, since ROSCOE with lymph node dissection for uterine CA, chemotherapy and radiation 2001. Cellulitis 2005, 2010, 2013     Morbid (severe) obesity due to excess calories 11/27/2012     Neurosis, posttraumatic 8/16/2011     Other specified health status 8/21/2014    Overview:  GREBO      Ovarian carcinoma 2001    ROSCOE, chemo and radiation     Severe episode of recurrent major depressive disorder, without psychotic features 8/16/2011     Spinal stenosis of lumbar region 4/20/2015     Systolic murmur      Type 2 diabetes mellitus 6/16/2010    Overview:  Better controlled, last A1C 2/2015: 7.0       Past Surgical History:   Procedure Laterality Date     HYSTERECTOMY           Current Outpatient  Prescriptions:      acetaminophen (TYLENOL) 500 MG tablet, Take 1,000 mg by mouth daily as needed. , Disp: , Rfl:      artificial tears,hypromellose, (GENTEAL) 0.3 % Drop, Apply to eye., Disp: , Rfl:      ascorbic acid, vitamin C, (VITAMIN C) 250 MG tablet, One tablet daily per [facility] 3/20/1, Disp: , Rfl:      aspirin 81 MG EC tablet, Take 81 mg by mouth., Disp: , Rfl:      atorvastatin (LIPITOR) 80 MG tablet, Take 80 mg by mouth., Disp: , Rfl:      cholecalciferol, vitamin D3, 400 unit Tab, Take 800 Units by mouth., Disp: , Rfl:      docusate sodium (COLACE) 100 MG capsule, Take 100 mg by mouth., Disp: , Rfl:      furosemide (LASIX) 20 MG tablet, Take 1 tablet (20 mg total) by mouth 2 (two) times a day at 9am and 6pm for 4 days. Instructed to eat a banana with it daily for potassium, Disp: , Rfl:      gabapentin (NEURONTIN) 300 MG capsule, Take 300 mg by mouth., Disp: , Rfl:      insulin glargine (LANTUS; BASAGLAR) 100 unit/mL (3 mL) pen, Inject 40 Units under the skin., Disp: , Rfl:      lisinopril (PRINIVIL,ZESTRIL) 5 MG tablet, Take 5 mg by mouth., Disp: , Rfl:      metFORMIN (GLUCOPHAGE) 1000 MG tablet, Take 1,000 mg by mouth., Disp: , Rfl:      traMADol (ULTRAM) 50 mg tablet, Take 50 mg by mouth., Disp: , Rfl:     No Known Allergies    Social History     Social History     Marital status: Single     Spouse name: N/A     Number of children: N/A     Years of education: N/A     Occupational History     Not on file.     Social History Main Topics     Smoking status: Never Smoker     Smokeless tobacco: Never Used     Alcohol use No     Drug use: No     Sexual activity: No     Other Topics Concern     Not on file     Social History Narrative    .  Six children.  Apartment with [employer] and family helping.       Family History   Problem Relation Age of Onset     No Medical Problems Mother      No Medical Problems Father      No Medical Problems Brother      No Medical Problems Daughter      No  Medical Problems Daughter      No Medical Problems Daughter      No Medical Problems Daughter      No Medical Problems Son      No Medical Problems Son      No Medical Problems Son        Review of Systems:  Kaylyn Godfrey no new numbess, tingling or weakness, redness or rashes, fevers, new masses, abdominal bloating or discomfort, unexplained weight loss, increased pain, new ulcers, shortness of breath and chest pain  Full 12 point review of systems was completed.    Imaging:  CT ABDOMEN PELVIS W ORAL W IV CONTRAST  3/10/2017 2:36 PM      INDICATION: History of ovarian carcinoma. Leg swelling and abdominal fullness.  TECHNIQUE: CT abdomen and pelvis. Multiplanar reformation images (MPR). Dose reduction techniques were used.   IV CONTRAST: Iohexol (Omni) 100 mL  COMPARISON: None available     FINDINGS:  LUNG BASES: Negative.     ABDOMEN: There are small calcifications along the periphery of the spleen and liver. Otherwise the spleen, pancreas, and adrenal glands are normal in appearance. The liver is normal in appearance with hypoattenuation adjacent to the falciform ligament   likely a perfusion abnormality or focal fat deposition. The gallbladder is normal in appearance. There is no biliary ductal dilatation. Incidentally noted is pancreas divisum or a persistent patent duct of [place]. A subcentimeter hypoattenuating focus   in the left kidney is too small to characterize. Otherwise the kidneys are normal in appearance without hydronephrosis or hydroureter. The stomach is distended with fluid an air. Small bowel is normal in caliber. No enlarged lymph nodes in the   gastrohepatic a eddie hepatis, mesenteric, or retroperitoneal distributions. There are surgical clips in the retroperitoneum.     PELVIS: The urinary bladder exhibits circumferential wall thickening. The patient is status post hysterectomy. The ovaries are not identified and may be surgically absent. The colon is normal in appearance. The appendix  is likely normal in caliber. There   are surgical clips along the pelvic sidewalls. No pelvic lymphadenopathy identified.     MUSCULOSKELETAL: There is sclerosis of the iliac bones adjacent to the sacroiliac joints. Bony lucencies noted at both sacral iliac joints and within the areas of sclerosis. There is sclerosis and irregularity of the pubic symphysis. There is mild   stranding in the included anterior lower extremities. Mild curvature of the spine.     IMPRESSION:   CONCLUSION:  1.  Circumferential wall thickening of the urinary bladder. Correlate for cystitis.  2.  Mild stranding in the subcutaneous fat of the included lower extremities, nonspecific though possibly related to edema.  3.  Status post hysterectomy. Likely status post bilateral salpingo-oophorectomy. There are surgical clips along both pelvic sidewalls and in the retroperitoneum. There are no new enlarged lymph nodes.  4.  Sclerosis and lucency of the pelvis adjacent to the sacroiliac joints may be related to metastatic disease and/or prior treatment. Recommend comparison with any prior studies. Consider additional evaluation if clinically indicated.    Labs:        Lab Results   Component Value Date    CREATININE 0.71 06/11/2010      No results found for: HGBA1C        Lab Results   Component Value Date    BUN 14 06/11/2010              No results found for: LABPROT, ALBUMIN    Vitamin D, Total (25-Hydroxy)   Date Value Ref Range Status   03/10/2017 24.3 (L) 30.0 - 80.0 ng/mL Final       Ref Range & Units3/10/17  1:00 PM  TSH0.30 - 5.00 uIU/mL  1.75  Resulting Agency[facility]  Ref Range & Units3/10/17  1:00 PM  Free Retinol (Vit A)32.5 - 78.0 mcg/dL  40.0  Ref Range & Units3/16/17 12:06 PM  CA 1250.0 - 35.0 U/mL  29.5    3/16/17  Vit C  <0.1 mg/dl    Physical Exam:  Vitals:    03/21/18 0933   BP: 106/73   Pulse: 82   Resp: 16   Temp: 98.6  F (37  C)     BMI 46.74 (stable)    Circumferential measures:    Vasc Edema 3/9/2017 4/27/2017 8/21/2017  3/21/2018   Right just above MTP 24 26.4 24.5 24.4   Right Ankle 27.3 30 22.5 25   Right Widest Calf 64.5 67 63 82   Right Thigh Up 10cm 84 84 69.6 70.5   Left - just above MTP 23.2 23.4 23.5 24   Left Ankle 24.8 29.2 23.2 25.5   Left Widest Calf 65.5 69.2 64 67   Left Thigh Up 10cm 82.5 82.5 76 80     Circumferential measures are up again.     General:  73 y.o. female in no apparent distress.  Alert and oriented x 3.  Cooperative. Affect normal.    Musculoskeletal:  Normal range of motion inknees and ankles bilaterally throughout .  There is no active joint synovitis, erythema, swelling or joint laxity.     Integumentary: Skin of the legs is uniformly warm and dry,  with hyperkeratosis and keratoderma and with positive Stemmer's Sign in both legs. There continues to be severe fibrosis of the legs, feet and toes.  The swelling has increased again with nonpitting edema.  Nails are thickened, decreased hair growth on toes, discolored, ingrown and brittleness.  Continuing significant leg deformities due to underlying lipidema with multiple skin folds and abnormal fatty deposition combined with the acquired lymphedema.    Marilu Hercules MD, ABWMS, FACCWS, [facility]  Medical Director Wound Care and Lymphedema  [facility]  [phone]

## 2021-06-25 NOTE — PROGRESS NOTES
"Progress Notes by Marilu Hercules MD at 3/9/2017  9:20 AM     Author: Marilu Hercules MD Service: -- Author Type: Physician    Filed: 3/9/2017 12:54 PM Encounter Date: 3/9/2017 Status: Signed    : Marilu Hercules MD (Physician)       Referred By: Drea Lopes MD    Date Of Service: 03/09/2017    Chief Complaint: Bilateral leg swelling    History of Present Illness:    Kaylyn Godfrey presents to the [facility], as a new consult, with Bilateral leg swelling.  She is here with her daughter with is her .  She herself understands only some English.  She immigrated from [place] after growing up in [facility] [place] [facility].  She came here in 2000.  The swelling began in 2003 after ROSCOE, chemo therapy and radiation for ovarian carcinoma discovered soon after she immigrated to the [place].  She has continued to have worsening swelling.  I believe she had one treatment of therapy with bandaging.  She has been sent here for help. The swelling has worsened since onset.  Pain has been worsening.  She is on tramadol and gabapentin.  Pain is described as burning and they get red.   The swelling is improved with elevation but does not resolve.  The patient sleeps in a bed.   There has been no new numbness, tingling or weakness.  There have been no new masses, rashes, or swellings of any other joints. There has been no new decrease in appetite, unexplained weight loss, abdominal bloating, bowel or bladder changes and irregular bleeding.  She complains of fatigue.  She has always had a pear shaped body.  Her legs however were \"very nice\" per her daughter.    Past Medical History:   Diagnosis Date   ? Anemia    ? Degeneration of intervertebral disc of lumbar region 10/28/2014    Overview:  MRI with multilevel DDD, with L5 nerve impingement.   ? Dyslipidemia 6/16/2010   ? HH (hiatus hernia)    ? Hypertension 6/16/2010   ? Lymphedema 6/16/2010    Overview:  Bilateral lower extremity, since " ROSCOE with lymph node dissection for uterine CA, chemotherapy and radiation 2001. Cellulitis 2005, 2010, 2013   ? Morbid (severe) obesity due to excess calories 11/27/2012   ? Neurosis, posttraumatic 8/16/2011   ? Other specified health status 8/21/2014    Overview:  GREBO    ? Ovarian carcinoma 2001    ROSCOE, chemo and radiation   ? Severe episode of recurrent major depressive disorder, without psychotic features 8/16/2011   ? Spinal stenosis of lumbar region 4/20/2015   ? Systolic murmur    ? Type 2 diabetes mellitus 6/16/2010    Overview:  Better controlled, last A1C 2/2015: 7.0       Past Surgical History:   Procedure Laterality Date   ? HYSTERECTOMY           Current Outpatient Prescriptions:   ?  acetaminophen (TYLENOL) 500 MG tablet, Take 1,000 mg by mouth., Disp: , Rfl:   ?  artificial tears,hypromellose, (GENTEAL) 0.3 % Drop, Apply to eye., Disp: , Rfl:   ?  aspirin 81 MG EC tablet, Take 81 mg by mouth., Disp: , Rfl:   ?  atorvastatin (LIPITOR) 80 MG tablet, Take 80 mg by mouth., Disp: , Rfl:   ?  gabapentin (NEURONTIN) 300 MG capsule, Take 300 mg by mouth., Disp: , Rfl:   ?  insulin glargine (LANTUS; BASAGLAR) 100 unit/mL (3 mL) pen, Inject 40 Units under the skin., Disp: , Rfl:   ?  lisinopril (PRINIVIL,ZESTRIL) 5 MG tablet, Take 5 mg by mouth., Disp: , Rfl:   ?  metFORMIN (GLUCOPHAGE) 1000 MG tablet, Take 1,000 mg by mouth., Disp: , Rfl:   ?  traMADol (ULTRAM) 50 mg tablet, Take 50 mg by mouth., Disp: , Rfl:   ?  cephalexin (KEFLEX) 500 MG capsule, Take 1 capsule (500 mg total) by mouth 4 (four) times a day for 10 days., Disp: 40 capsule, Rfl: 0  ?  docusate sodium (COLACE) 100 MG capsule, Take 100 mg by mouth., Disp: , Rfl:   ?  furosemide (LASIX) 20 MG tablet, Take 1 tablet (20 mg total) by mouth 2 (two) times a day at 9am and 6pm for 4 days., Disp: 10 tablet, Rfl: 0    No Known Allergies    Social History     Social History   ? Marital status: Single     Spouse name: N/A   ? Number of children: N/A   ?  Years of education: N/A     Occupational History   ? Not on file.     Social History Main Topics   ? Smoking status: Never Smoker   ? Smokeless tobacco: Never Used   ? Alcohol use No   ? Drug use: No   ? Sexual activity: No     Other Topics Concern   ? Not on file     Social History Narrative    .  Six children.  Apartment with [employer] and family helping.       Family History   Problem Relation Age of Onset   ? No Medical Problems Mother    ? No Medical Problems Father    ? No Medical Problems Brother    ? No Medical Problems Daughter    ? No Medical Problems Daughter    ? No Medical Problems Daughter    ? No Medical Problems Daughter    ? No Medical Problems Son    ? No Medical Problems Son    ? No Medical Problems Son        Review of Systems:  Review of systems is otherwise negative, except as noted above.  Full 12 point review of systems was completed.    Radiology/Diagnostic Studies:   [facility] MEDICAL IMAGING  MR SPINE LUMBAR WWO  7/1/2014 11:55 AM     INDICATION: Back pain.  TECHNIQUE: Routine MRI lumbar spine without and with intravenous contrast.  CONTRAST: 10 mL intravenous gadolinium this  COMPARISON: MRI lumbar spine 08/28/2010. Lumbar radiographs 06/28/2014    FINDINGS: Nomenclature is based on 5 lumbar type vertebral bodies. Lumbar dextrocurvature is similar to findings on the prior exam. Minimal right lateral listhesis of L3 upon L4 and L4 upon L5. Sagittal alignment is grossly unremarkable. Vertebral body heights are unchanged. Multilevel endplate degenerative changes including Modic type I endplate edema with associated reactive enhancement asymmetric to the left along the concavity of the spinal curvature at L2-L3 and L3-L4, slightly progressed in the interval. Additional endplate degenerative changes and presumed reactive enhancement asymmetric to the right at L5-S1. Patchy areas of Modic type II endplate change at L3-L4. No definite foci of pathologic marrow replacement. No definite disc space  enhancement or pathologic disc space fluid signal. No pars defect. The conus tip is identified at upper L2. Mild lower lumbar paraspinal muscular atrophy slightly asymmetric to the right. Normal aortic diameter. Degenerative changes of the hips. Degenerative change the bilateral sacroiliac joints similar to findings on the prior exam with decreased signal intensity in the adjacent marrow space suggesting sclerosis.    T12-L1: Disc desiccation with relatively preserved disc height. No focal disc herniation. Minimal facet arthropathy. No spinal canal or neural foraminal stenosis.    L1-L2: Disc desiccation with minimal loss of disc height. Minimal posterior disc bulge slightly asymmetric to the right. Mild facet arthropathy. No significant overall spinal canal stenosis. Mild bilateral neural foraminal stenosis.    L2-L3: Moderate to advanced loss of disc height and signal with circumferential disc bulge and interbody spurring. Degenerative disc changes have mildly progressed. Mild facet arthropathy. No significant spinal canal stenosis with mild asymmetric narrowing of the left lateral recess and minimal impact upon the left L3 nerve root. Mild to moderate neural foraminal stenosis, slightly greater on the left than the right.    L3-L4: Moderate to advanced loss of disc height and signal, slightly progressed in the interval. Minimal circumferential disc bulge and interbody spurring are again noted. Moderate bilateral facet arthropathy and ligamentum flavum thickening. No significant spinal canal stenosis. Mild asymmetric narrowing of the left lateral recess with minimal impact upon the left L4 nerve root. Moderate to severe left and mild right neural foraminal stenosis similar to the prior exam.    L4-L5: Moderate loss of disc height and signal similar to the prior exam. Broad posterior disc bulge and moderate bilateral facet arthropathy, slightly progressed in the interval. Mild trefoil spinal canal stenosis with  narrowing of both lateral recesses and minimal impact upon the right greater than left L5 nerve roots similar to the prior exam. Moderate to severe right and moderate left neural foraminal stenosis.    L5-S1: Moderate loss of disc height and signal. Minimal posterior disc bulge and interbody spurring. Moderate to advanced bilateral facet arthropathy. Narrowing of both lateral recesses with minimal impact upon traversing S1 nerve roots. Minimal overall spinal canal stenosis. Severe right and mild to moderate left neural foraminal stenosis with impingement upon the exiting right L5 nerve root similar to the prior exam.    CONCLUSION:  1.  Mild interval progression of degenerative disc, endplate and facet changes most notable at L2-L3.  2.  Increase in endplate edema and associated reactive enhancement at the L2-L3 level with minimal increased endplate edema at L3-L4 and L5-S1. The lack of significant disc space fluid or enhancement suggests that these changes are likely degenerative in nature. Infectious processes are considered less likely, but clinical correlation is suggested.  3.  Mild trefoil spinal canal stenosis at L4-L5 with impingement upon the right greater than left L5 nerve roots similar to the prior exam. Asymmetric lateral recess stenosis on the left at L2-L3 and L3-L4 produces mild mass effect upon the left L3 and L4 nerve roots. No new high-grade spinal canal stenosis.  4.  Severe right neural foraminal stenosis at L5-S1 similar to the prior exam. Moderate to severe neural foraminal stenosis on the right at L4-L5 and on the left at L3-L4 are again noted. Lesser degrees of neural foraminal narrowing at other levels as detailed above.    [facility]  US VENOUS LOWER EXTREMITY LEFT  6/28/2014 1:35 PM    INDICATION: Left leg swelling. Lymphedema. Evaluate for lower extremity deep vein thrombosis.   TECHNIQUE: Routine exam with compression, augmentation, and duplex utilizing 2D gray-scale imaging,  Doppler interrogation with color-flow and spectral waveform analysis.  COMPARISON: 10/29/2011.    FINDINGS: The common femoral, femoral, popliteal, and segmentally visualized calf veins were evaluated. Lower femoral veins in the thigh difficult to visualize due to body habitus, however, no gross evidence for deep vein thrombosis in this region.    Left leg veins are negative for deep venous thrombosis. No popliteal cysts.    CONCLUSION:  1.  Left leg veins are negative for DVT.      Laboratory Studies:  2/28/17  TSH  2.92    12/16/15  Vit D  22.6    7/2/14  CRP  31.2    7/31/13  ESR  67         Lab Results   Component Value Date    CREATININE 0.71 06/11/2010      No results found for: HGBA1C        Lab Results   Component Value Date    BUN 14 06/11/2010                Physical Exam:  Vitals:    03/09/17 0933   BP: 137/66   Pulse: 74   Resp: 18   Temp: 97.7  F (36.5  C)    Body mass index is 46.74 kg/(m^2).    See flow chart for circumferential measures.    Vasc Edema 3/9/2017   Right just above MTP 24   Right Ankle 27.3   Right Widest Calf 64.5   Right Thigh Up 10cm 84   Left - just above MTP 23.2   Left Ankle 24.8   Left Widest Calf 65.5   Left Thigh Up 10cm 82.5       General:  72 y.o. female in no apparent distress.  Alert and oriented x 3.  Cooperative. Affect normal.     Respiratory:  Lungs are clear to auscultation throughout with full inspiration    Cardiovascular: Normal S1, S2 without murmur, gallop or rub.  No audible carotid bruits. Regular rhythm.     Abdominal: Normal bowel sounds without any pain, guarding or rigidity. Mild left inguinal lymphadenopathy palpated with some noted along femoral chain.    Musculoskeletal:  Normal range of motion in hips, knees and ankles bilaterally throughout .  There is no active joint synovitis, erythema, swelling or joint laxity.     Neurological:  Sensation is intact to pin prick and light touch in both legs.  Strength testing is normal in hip flexion, knee flexion,  knee extension, ankle dorsiflexion and great toe extension bilaterally. Deep tendon reflexes, knee jerks and ankle jerks, unable to check with positioning and communication barrier.      Vascular: Dorsalis pedis and posterior tibialis pulses are strong and equal bilaterally and reveal audible biphasic waves with a hand held doppler bilaterally. There are no significant telangietasias, medial ankle venous flares, venous varicosities  and spider veins . There is normal capillary refill.     Integumentary: Skin of the legs is uniformly warm and dry, erythematous with calor , with hyperkeratosis and keratoderma and with positive Stemmer's Sign . Pain to palpation on medial upper thighs bilaterally. There is severe fibrosis of the legs, feet and toes.  +1 pitting edema with nonpitting edema.  Nails are thickened, decreased hair growth on toes, discolored, ingrown and brittleness. Small light weeping areas in both legs.  The daughter states sometimes the legs look red especially when she showers.  See photo below.        3/9/17 legs    Impression:  1.  Acquired lymphedema severe-elephantiasis bilateral legs-complicated by underlying lipedema  2.  Dependent swelling with venous hypertension of the legs bilaterally  3.  Leg pain  4.  Probable cellulitis with upper thighs  5.  Risk nutritional deficiencies  6.  Ovarian Ca (2001 ROSCOE, chemo and radiation)    Plan:  1. Type of swelling was reviewed in detail with the patient.  Questions were answered and education was completed.  2.  With worsening swelling and history of ovarian ca would like to be assessed by oncology.  Referral provided to [facility], Dr. Ambrose.   3.  Keflex for probable cellulitis  4.  Lasix for 4 days to decrease overall fluid status.  Encouraged to eat a banana daily with this.  5.  TSH, Vit A/D/C to check for possible contributions to swelling and recurrent infections  6.  Abd/pelvic CT to check for proximal obstruction  7.  Therapy for better control of  swelling and then compression.  Ordered.    8   Patient will follow up in 6 weeks or when needed.  If something comes back of concern on the lab testing or imaging the plan of care will change as deemed appropriate.    Thank you very much for allowing me to see Kaylyn Godfrey today.  If you have any questions, please feel free to contact me.    Time spent with patient 60 minutes with greater than 50% time in consultation, education and coordination of care.     Marilu Hercules MD, ABWMS, FACCWS, [facility]  Medical Director Wound Care and Lymphedema  [facility]  [phone]

## 2021-07-03 NOTE — ADDENDUM NOTE
Addendum Note by Melissa Chery RN at 3/13/2017  3:16 PM     Author: Melissa Chery RN Service: -- Author Type: --    Filed: 3/13/2017  3:16 PM Encounter Date: 3/9/2017 Status: Signed    : Melissa Chery RN (Registered Nurse)    Addended by: MELISSA CHERY on: 3/13/2017 03:16 PM        Modules accepted: Orders

## 2021-08-03 PROBLEM — C56.9: Status: RESOLVED | Noted: 2017-03-09 | Resolved: 2017-04-27
